# Patient Record
Sex: FEMALE | Race: WHITE | NOT HISPANIC OR LATINO | Employment: FULL TIME | URBAN - METROPOLITAN AREA
[De-identification: names, ages, dates, MRNs, and addresses within clinical notes are randomized per-mention and may not be internally consistent; named-entity substitution may affect disease eponyms.]

---

## 2017-01-27 ENCOUNTER — OFFICE VISIT (OUTPATIENT)
Dept: URGENT CARE | Facility: CLINIC | Age: 42
End: 2017-01-27
Payer: COMMERCIAL

## 2017-01-27 ENCOUNTER — HOSPITAL ENCOUNTER (OUTPATIENT)
Dept: RADIOLOGY | Facility: CLINIC | Age: 42
Discharge: HOME | End: 2017-01-27
Admitting: FAMILY MEDICINE
Payer: COMMERCIAL

## 2017-01-27 DIAGNOSIS — M79.642 PAIN OF LEFT HAND: ICD-10-CM

## 2017-01-27 PROCEDURE — 73130 X-RAY EXAM OF HAND: CPT

## 2017-01-27 PROCEDURE — 99213 OFFICE O/P EST LOW 20 MIN: CPT

## 2017-12-11 ENCOUNTER — APPOINTMENT (OUTPATIENT)
Dept: RADIOLOGY | Facility: CLINIC | Age: 42
End: 2017-12-11
Payer: COMMERCIAL

## 2017-12-11 ENCOUNTER — OFFICE VISIT (OUTPATIENT)
Dept: URGENT CARE | Facility: CLINIC | Age: 42
End: 2017-12-11
Payer: COMMERCIAL

## 2017-12-11 DIAGNOSIS — M25.552 PAIN IN LEFT HIP: ICD-10-CM

## 2017-12-11 PROCEDURE — 99213 OFFICE O/P EST LOW 20 MIN: CPT

## 2017-12-11 PROCEDURE — 73502 X-RAY EXAM HIP UNI 2-3 VIEWS: CPT

## 2017-12-12 NOTE — PROGRESS NOTES
Assessment    1  Lumbosacral strain, initial encounter (846 0) (S39 012A)   2  Strain of left iliopsoas muscle, initial encounter (843 8) (D91 269R)    Plan  Left hip pain    · * XR HIP/PELV 2-3 VWS LEFT W PELVIS IF PERFORMED; Status:Active - RetrospectiveBy Protocol Authorization; Requested for:[id number];   Strain of left iliopsoas muscle, initial encounter    · Hydrocodone-Acetaminophen 5-325 MG Oral Tablet; TAKE 1 TABLET EVERY 4 TO6 HOURS AS NEEDED FOR PAIN   · Methocarbamol 750 MG Oral Tablet (Robaxin-750); TAKE 1 TABLET 4 TIMES DAILYAS NEEDED    Discussion/Summary  Discussion Summary:   Rest; Continue Naproxen; Start Robaxin, take Norco for severe pain, limit activity; recheck 3-4 days if symptoms persist       Chief Complaint    1  Hip Pain  Chief Complaint Free Text Note Form: Yesterday the patient developed left hip pain when getting out of her car  She reports a constant ache in her hip, 6/10  She attempted icy hot and naproxen without any relief  History of Present Illness  HPI: Pt c/o pain, L hip and iliac crest area since getting out of car yesterday;pain increases with motion; no localized tenderness; pain feels like it is on inside of L iliac crest   Hospital Based Practices Required Assessment:  Pain Assessment  the patient states they have pain  The pain is located in the left hip  The patient describes the pain as aching  (on a scale of 0 to 10, the patient rates the pain at 6 ) Reason DV Screen not done: family present   Depression And Suicide Screen  Reason suicide screen not done: family present  Review of Systems  Focused-Female:  Constitutional: No fever, no chills, feels well, no tiredness, no recent weight gain or loss  ENT: no ear ache, no loss of hearing, no nosebleeds or nasal discharge, no sore throat or hoarseness  Cardiovascular: no complaints of slow or fast heart rate, no chest pain, no palpitations, no leg claudication or lower extremity edema    Respiratory: no complaints of shortness of breath, no wheezing, no dyspnea on exertion, no orthopnea or PND  Breasts: no complaints of breast pain, breast lump or nipple discharge  Gastrointestinal: no complaints of abdominal pain, no constipation, no nausea or diarrhea, no vomiting, no bloody stools  Genitourinary: no complaints of dysuria, no incontinence, no pelvic pain, no dysmenorrhea, no vaginal discharge or abnormal vaginal bleeding  Musculoskeletal: L hip area pain, but-- as noted in HPI  Integumentary: no complaints of skin rash or lesion, no itching or dry skin, no skin wounds  Neurological: no complaints of headache, no confusion, no numbness or tingling, no dizziness or fainting  Active Problems    1  Sprain of finger of left hand, initial encounter (842 10) (G20 182C)    Current Meds   1  Omeprazole 40 MG Oral Capsule Delayed Release; Therapy: (Recorded:46Ore0318) to Recorded   2  ZyrTEC Allergy CAPS; Therapy: (Recorded:09Mbl8678) to Recorded    Allergies    1  No Known Drug Allergies    Vitals  Signs   Recorded: 24Zda0355 10:10AM   Temperature: 98 9 F  Heart Rate: 90  Respiration: 16  Systolic: 202  Diastolic: 78  Height: 5 ft 7 in  Weight: 216 lb   BMI Calculated: 33 83  BSA Calculated: 2 09  O2 Saturation: 97  Pain Scale: 6    Physical Exam   Constitutional  General appearance: No acute distress, well appearing and well nourished  Eyes  Conjunctiva and lids: No swelling, erythema or discharge  Pupils and irises: Equal, round and reactive to light  Ears, Nose, Mouth, and Throat  External inspection of ears and nose: Normal    Otoscopic examination: Tympanic membranes translucent with normal light reflex  Canals patent without erythema  Nasal mucosa, septum, and turbinates: Normal without edema or erythema  Oropharynx: Normal with no erythema, edema, exudate or lesions  Pulmonary  Respiratory effort: No increased work of breathing or signs of respiratory distress     Auscultation of lungs: Clear to auscultation  Cardiovascular  Palpation of heart: Normal PMI, no thrills  Auscultation of heart: Normal rate and rhythm, normal S1 and S2, without murmurs  Examination of extremities for edema and/or varicosities: Normal    Abdomen  Abdomen: Non-tender, no masses  Liver and spleen: No hepatomegaly or splenomegaly  Lymphatic  Palpation of lymph nodes in neck: No lymphadenopathy  Musculoskeletal  Gait and station: Abnormal  -- Pain, iliac crest with any movemen t of L leg, back  Digits and nails: Normal without clubbing or cyanosis  Inspection/palpation of joints, bones, and muscles: Abnormal  -- Full ROM of hip, L elg  Skin  Skin and subcutaneous tissue: Normal without rashes or lesions  Neurologic  Cranial nerves: Cranial nerves 2-12 intact  Reflexes: 2+ and symmetric  Sensation: No sensory loss  Psychiatric  Orientation to person, place, and time: Normal    Mood and affect: Normal        Results/Data  Diagnostic Studies Reviewed: I personally reviewed the films/images/results in the office today  My interpretation follows  X-ray Review L hip - Negative - TF        Signatures   Electronically signed by : Ilsa Saint, M D ; Dec 11 2017 10:54AM EST                       (Author)

## 2018-01-05 ENCOUNTER — OFFICE VISIT (OUTPATIENT)
Dept: URGENT CARE | Facility: CLINIC | Age: 43
End: 2018-01-05
Payer: COMMERCIAL

## 2018-01-05 PROCEDURE — 99213 OFFICE O/P EST LOW 20 MIN: CPT

## 2018-01-06 NOTE — PROGRESS NOTES
Assessment   1  History of Strain of left iliopsoas muscle, initial encounter (843 8) (F26 844C)   2  Strain of left iliopsoas muscle, initial encounter (843 8) (L91 326X)    Plan   Strain of left iliopsoas muscle, initial encounter    · Hydrocodone-Acetaminophen 5-325 MG Oral Tablet (Norco); TAKE 1 TABLET    EVERY 4 TO 6 HOURS AS NEEDED FOR PAIN   · Methocarbamol 750 MG Oral Tablet (Robaxin-750); TAKE 1 TABLET 4 TIMES DAILY    AS NEEDED    Discussion/Summary   Discussion Summary:    Norco for sever pain, Robaxin 4 x a day, Recheck with PCP if symptoms  Chief Complaint   1  Hip Pain  Chief Complaint Free Text Note Form: Patient relates injured left hip x1 month ago and yesterday bend down to  a box and re-injured left hip  Denies falling  Denies fever  History of Present Illness   HPI: Pt seen here last month for L hip/iliopsoas strain; got better but symptoms recurred last PM when bending over    Hospital Based Practices Required Assessment:      Abuse And Domestic Violence Screen       Yes, the patient is safe at home  -- The patient states no one is hurting them  Depression And Suicide Screen  No, the patient has not had thoughts of hurting themself  No, the patient has not felt depressed in the past 7 days  Prefered Language is  english  Primary Language is  english  Review of Systems   Focused-Female:      Constitutional: No fever, no chills, feels well, no tiredness, no recent weight gain or loss  ENT: no ear ache, no loss of hearing, no nosebleeds or nasal discharge, no sore throat or hoarseness  Cardiovascular: no complaints of slow or fast heart rate, no chest pain, no palpitations, no leg claudication or lower extremity edema  Respiratory: no complaints of shortness of breath, no wheezing, no dyspnea on exertion, no orthopnea or PND  Breasts: no complaints of breast pain, breast lump or nipple discharge        Gastrointestinal: no complaints of abdominal pain, no constipation, no nausea or diarrhea, no vomiting, no bloody stools  Genitourinary: no complaints of dysuria, no incontinence, no pelvic pain, no dysmenorrhea, no vaginal discharge or abnormal vaginal bleeding  Musculoskeletal: arthralgias-- and-- L hip pain with motion, but-- as noted in HPI  Integumentary: no complaints of skin rash or lesion, no itching or dry skin, no skin wounds  Neurological: no complaints of headache, no confusion, no numbness or tingling, no dizziness or fainting  Active Problems   1  Left hip pain (719 45) (M25 552)   2  Lumbosacral strain, initial encounter (846 0) (S39 012A)   3  Sprain of finger of left hand, initial encounter (842 10) (T00 111Y)    Past Medical History   1  History of Left hip pain (719 45) (M25 552)   2  History of Strain of left iliopsoas muscle, initial encounter (843 8) (Z15 751Q)    Family History   Mother    1  Family history of malignant neoplasm of breast (V16 3) (Z80 3)    Social History    · Never a smoker    Surgical History   1  Denied: History Of Prior Surgery    Current Meds    1  Hydrocodone-Acetaminophen 5-325 MG Oral Tablet; TAKE 1 TABLET EVERY 4 TO 6     HOURS AS NEEDED FOR PAIN;     Therapy: [id number] to (Evaluate:93Ans9230); Last Rx:11Dec2017 Ordered   2  Methocarbamol 750 MG Oral Tablet; TAKE 1 TABLET 4 TIMES DAILY AS NEEDED; Therapy: 02SYJ0442 to (Evaluate:27Yka4018)  Requested for: 11Dec2017; Last     Rx:11Dec2017 Ordered   3  Omeprazole 40 MG Oral Capsule Delayed Release; Therapy: (Recorded:11Dec2017) to Recorded   4  ZyrTEC Allergy CAPS; Therapy: (Recorded:27Jan2017) to Recorded    Allergies   1   No Known Drug Allergies    Vitals   Signs   Recorded: 81XSH8839 11:27AM   Temperature: 100 1 F, Tympanic  Heart Rate: 79  Respiration: 18  Systolic: 515, RUE, Sitting  Diastolic: 86, RUE, Sitting  Height: 5 ft 7 in  Weight: 216 lb 6 oz  BMI Calculated: 33 89  BSA Calculated: 2 09  O2 Saturation: 99, RA  Pain Scale: 6    Physical Exam        Constitutional      General appearance: No acute distress, well appearing and well nourished  Eyes      Conjunctiva and lids: No swelling, erythema or discharge  Pupils and irises: Equal, round and reactive to light  Ears, Nose, Mouth, and Throat      External inspection of ears and nose: Normal        Otoscopic examination: Tympanic membranes translucent with normal light reflex  Canals patent without erythema  Nasal mucosa, septum, and turbinates: Normal without edema or erythema  Oropharynx: Normal with no erythema, edema, exudate or lesions  Pulmonary      Respiratory effort: No increased work of breathing or signs of respiratory distress  Auscultation of lungs: Clear to auscultation  Cardiovascular      Palpation of heart: Normal PMI, no thrills  Auscultation of heart: Normal rate and rhythm, normal S1 and S2, without murmurs  Examination of extremities for edema and/or varicosities: Normal        Abdomen      Abdomen: Non-tender, no masses  Liver and spleen: No hepatomegaly or splenomegaly  Lymphatic      Palpation of lymph nodes in neck: No lymphadenopathy  Musculoskeletal      Gait and station: Normal        Digits and nails: Normal without clubbing or cyanosis  Inspection/palpation of joints, bones, and muscles: Abnormal  -- tenderness at L iliac crest with movement, bending  Skin      Skin and subcutaneous tissue: Normal without rashes or lesions  Neurologic      Cranial nerves: Cranial nerves 2-12 intact  Reflexes: 2+ and symmetric  Sensation: No sensory loss         Psychiatric      Orientation to person, place, and time: Normal        Mood and affect: Normal        Signatures    Electronically signed by : EVERETT Shaffer ; Jan 5 2018 11:48AM EST                       (Author)

## 2018-01-23 VITALS
DIASTOLIC BLOOD PRESSURE: 86 MMHG | HEIGHT: 67 IN | HEART RATE: 79 BPM | OXYGEN SATURATION: 99 % | WEIGHT: 216.38 LBS | TEMPERATURE: 100.1 F | BODY MASS INDEX: 33.96 KG/M2 | RESPIRATION RATE: 18 BRPM | SYSTOLIC BLOOD PRESSURE: 159 MMHG

## 2018-01-23 VITALS
WEIGHT: 216 LBS | HEIGHT: 67 IN | TEMPERATURE: 98.9 F | OXYGEN SATURATION: 97 % | SYSTOLIC BLOOD PRESSURE: 132 MMHG | RESPIRATION RATE: 16 BRPM | DIASTOLIC BLOOD PRESSURE: 78 MMHG | HEART RATE: 90 BPM | BODY MASS INDEX: 33.9 KG/M2

## 2018-08-07 ENCOUNTER — TRANSCRIBE ORDERS (OUTPATIENT)
Dept: SCHEDULING | Age: 43
End: 2018-08-07

## 2018-08-07 DIAGNOSIS — Z12.31 ENCOUNTER FOR SCREENING MAMMOGRAM FOR MALIGNANT NEOPLASM OF BREAST: Primary | ICD-10-CM

## 2018-08-09 ENCOUNTER — HOSPITAL ENCOUNTER (OUTPATIENT)
Dept: RADIOLOGY | Age: 43
Discharge: HOME | End: 2018-08-09
Attending: NURSE PRACTITIONER
Payer: COMMERCIAL

## 2018-08-09 DIAGNOSIS — Z12.31 ENCOUNTER FOR SCREENING MAMMOGRAM FOR MALIGNANT NEOPLASM OF BREAST: ICD-10-CM

## 2018-08-09 PROCEDURE — 77067 SCR MAMMO BI INCL CAD: CPT

## 2018-11-02 ENCOUNTER — APPOINTMENT (OUTPATIENT)
Dept: RADIOLOGY | Facility: CLINIC | Age: 43
End: 2018-11-02
Payer: COMMERCIAL

## 2018-11-02 ENCOUNTER — OFFICE VISIT (OUTPATIENT)
Dept: URGENT CARE | Facility: CLINIC | Age: 43
End: 2018-11-02
Payer: COMMERCIAL

## 2018-11-02 VITALS
RESPIRATION RATE: 16 BRPM | DIASTOLIC BLOOD PRESSURE: 78 MMHG | TEMPERATURE: 99.1 F | SYSTOLIC BLOOD PRESSURE: 134 MMHG | OXYGEN SATURATION: 99 % | BODY MASS INDEX: 35 KG/M2 | HEART RATE: 68 BPM | HEIGHT: 67 IN | WEIGHT: 223 LBS

## 2018-11-02 DIAGNOSIS — S93.402A SPRAIN OF LEFT ANKLE, UNSPECIFIED LIGAMENT, INITIAL ENCOUNTER: Primary | ICD-10-CM

## 2018-11-02 DIAGNOSIS — M25.572 ACUTE LEFT ANKLE PAIN: ICD-10-CM

## 2018-11-02 PROCEDURE — 99213 OFFICE O/P EST LOW 20 MIN: CPT | Performed by: PHYSICIAN ASSISTANT

## 2018-11-02 PROCEDURE — 73610 X-RAY EXAM OF ANKLE: CPT

## 2018-11-02 RX ORDER — OMEPRAZOLE 40 MG/1
CAPSULE, DELAYED RELEASE ORAL
COMMUNITY

## 2018-11-02 RX ORDER — NORETHINDRONE ACETATE AND ETHINYL ESTRADIOL AND FERROUS FUMARATE 1MG-20(21)
1 KIT ORAL DAILY
Refills: 3 | COMMUNITY
Start: 2018-09-11

## 2018-11-02 NOTE — PROGRESS NOTES
[facility]        NAME: Abena Charles is a 36 y o  female  : 1975    MRN: [id number]  DATE: 2018  TIME: 4:39 PM    Assessment and Plan   Acute left ankle pain [M25 572]  1  Acute left ankle pain  XR ankle 3+ vw left         Patient Instructions       Rest, ice, elevate the affected limb  Take over-the-counter pain medication for symptom relief  Follow up with Orthopedics this week  Call Page Anthony to schedule an appointment: [phone]  Go to ER if new or worsening symptoms occur  Chief Complaint     Chief Complaint   Patient presents with    Ankle Pain     Two days ago the pt developed pain in her left ankle that is made worse when walking/standing  She denies any  injury  History of Present Illness       Ankle Pain    Injury mechanism: Patient twisted her ankle twice over the summer, seemed to heal on its own  Patient went out trigger treating and walk a few miles and now the pain in her ankle is back  The pain is present in the left ankle  The pain is at a severity of 8/10  The pain is severe  The pain has been constant since onset  Pertinent negatives include no inability to bear weight, loss of motion, loss of sensation, muscle weakness, numbness or tingling  The symptoms are aggravated by palpation, movement and weight bearing  She has tried ice and NSAIDs for the symptoms  The treatment provided mild relief  Review of Systems   Review of Systems   Constitutional: Negative  Negative for chills, fatigue and fever  HENT: Negative  Eyes: Negative  Respiratory: Negative  Cardiovascular: Negative  Gastrointestinal: Negative for abdominal pain, constipation, diarrhea, nausea and vomiting  Endocrine: Negative  Genitourinary: Negative for dysuria, flank pain, frequency, pelvic pain, vaginal discharge and vaginal pain  Musculoskeletal: Positive for gait problem  Negative for back pain, neck pain and neck stiffness     Skin: Negative  Negative for pallor and rash  Allergic/Immunologic: Negative  Neurological: Negative for tingling, weakness and numbness  Hematological: Negative  Psychiatric/Behavioral: Negative  Current Medications       Current Outpatient Prescriptions:     Cetirizine HCl (ZYRTEC ALLERGY) 10 MG CAPS, Take by mouth, Disp: , Rfl:     JUNEL FE 1/20 1-20 MG-MCG per tablet, Take 1 tablet by mouth daily, Disp: , Rfl: 3    omeprazole (PriLOSEC) 40 MG capsule, Take by mouth, Disp: , Rfl:     Current Allergies     Allergies as of 11/02/2018    (No Known Allergies)            The following portions of the patient's history were reviewed and updated as appropriate: allergies, current medications, past family history, past medical history, past social history, past surgical history and problem list      No past medical history on file  No past surgical history on file  No family history on file  Medications have been verified  Objective   /78   Pulse 68   Temp 99 1 °F (37 3 °C)   Resp 16   Ht 5' 7" (1 702 m)   Wt 101 kg (223 lb)   SpO2 99%   BMI 34 93 kg/m²        Physical Exam     Physical Exam   Constitutional: She appears well-developed and well-nourished  No distress  HENT:   Head: Normocephalic and atraumatic  Neck: Normal range of motion  Neck supple  Cardiovascular: Normal rate, regular rhythm, normal heart sounds and intact distal pulses  Pulmonary/Chest: Effort normal and breath sounds normal  No respiratory distress  She has no wheezes  She has no rales  Musculoskeletal:        Left ankle: She exhibits normal range of motion, no swelling, no ecchymosis and no deformity  Tenderness  AITFL tenderness found  Achilles tendon normal         Left lower leg: Normal  She exhibits no tenderness, no swelling and no edema  Left foot: There is normal range of motion, no bony tenderness, no swelling and no deformity     Lymphadenopathy:     She has no cervical adenopathy  Skin: Skin is warm  No rash noted  She is not diaphoretic  Nursing note and vitals reviewed

## 2018-11-02 NOTE — PATIENT INSTRUCTIONS
Rest, ice, elevate the affected limb  Take over-the-counter pain medication for symptom relief  Follow up with Orthopedics this week  Call Sabiha Ureña to schedule an appointment: [phone]  Go to ER if new or worsening symptoms occur  Ankle Sprain   WHAT YOU NEED TO KNOW:   An ankle sprain happens when 1 or more ligaments in your ankle joint stretch or tear  Ligaments are tough tissues that connect bones  Ligaments support your joints and keep your bones in place  DISCHARGE INSTRUCTIONS:   Return to the emergency department if:   · You have severe pain in your ankle  · Your foot or toes are cold or numb  · Your ankle becomes more weak or unstable (wobbly)  · You are unable to put any weight on your ankle or foot  · Your swelling has increased or returned  Contact your healthcare provider if:   · Your pain does not go away, even after treatment  · You have questions or concerns about your condition or care  Medicines: You may need any of the following:  · NSAIDs , such as ibuprofen, help decrease swelling, pain, and fever  This medicine is available with or without a doctor's order  NSAIDs can cause stomach bleeding or kidney problems in certain people  If you take blood thinner medicine, always ask your healthcare provider if NSAIDs are safe for you  Always read the medicine label and follow directions  · Acetaminophen  decreases pain  It is available without a doctor's order  Ask how much to take and how often to take it  Follow directions  Acetaminophen can cause liver damage if not taken correctly  · Prescription pain medicine  may be given  Ask how to take this medicine safely  · Take your medicine as directed  Contact your healthcare provider if you think your medicine is not helping or if you have side effects  Tell him or her if you are allergic to any medicine  Keep a list of the medicines, vitamins, and herbs you take   Include the amounts, and when and why you take them  Bring the list or the pill bottles to follow-up visits  Carry your medicine list with you in case of an emergency  Self care:   · Use support devices,  such as a brace, cast, or splint, may be needed to limit your movement and protect your joint  You may need to use crutches to decrease your pain as you move around  · Go to physical therapy as directed  A physical therapist teaches you exercises to help improve movement and strength, and to decrease pain  · Rest  your ankle so that it can heal  Return to normal activities as directed  · Apply ice on your ankle for 15 to 20 minutes every hour or as directed  Use an ice pack, or put crushed ice in a plastic bag  Cover it with a towel  Ice helps prevent tissue damage and decreases swelling and pain  · Compress  your ankle  Ask if you should wrap an elastic bandage around your injured ligament  An elastic bandage provides support and helps decrease swelling and movement so your joint can heal  Wear as long as directed  · Elevate  your ankle above the level of your heart as often as you can  This will help decrease swelling and pain  Prop your ankle on pillows or blankets to keep it elevated comfortably  Prevent another ankle sprain:   · Let your ankle heal   Find out how long your ligament needs to heal  Do not do any physical activity until your healthcare provider says it is okay  If you start activity too soon, you may develop a more serious injury  · Always warm up and stretch  before you exercise or play sports  · Use the right equipment  Always wear shoes that fit well and are made for the activity that you are doing  You may also need ankle supports, elbow and knee pads, or braces  Follow up with your healthcare provider as directed:  Write down your questions so you remember to ask them during your visits     © 2017 [employer] Information is for End User's use only and may not be sold, redistributed or otherwise used for commercial purposes  All illustrations and images included in CareNotes® are the copyrighted property of A D A M , Inc  or Nicolas Silva  The above information is an  only  It is not intended as medical advice for individual conditions or treatments  Talk to your doctor, nurse or pharmacist before following any medical regimen to see if it is safe and effective for you

## 2018-12-28 ENCOUNTER — OFFICE VISIT (OUTPATIENT)
Dept: URGENT CARE | Facility: CLINIC | Age: 43
End: 2018-12-28
Payer: COMMERCIAL

## 2018-12-28 ENCOUNTER — APPOINTMENT (OUTPATIENT)
Dept: RADIOLOGY | Facility: CLINIC | Age: 43
End: 2018-12-28
Payer: COMMERCIAL

## 2018-12-28 VITALS
BODY MASS INDEX: 36.16 KG/M2 | RESPIRATION RATE: 16 BRPM | TEMPERATURE: 97 F | DIASTOLIC BLOOD PRESSURE: 76 MMHG | HEART RATE: 98 BPM | OXYGEN SATURATION: 97 % | HEIGHT: 66 IN | SYSTOLIC BLOOD PRESSURE: 132 MMHG | WEIGHT: 225 LBS

## 2018-12-28 DIAGNOSIS — S69.92XA HAND INJURY, LEFT, INITIAL ENCOUNTER: Primary | ICD-10-CM

## 2018-12-28 DIAGNOSIS — S69.92XA HAND INJURY, LEFT, INITIAL ENCOUNTER: ICD-10-CM

## 2018-12-28 PROCEDURE — 73130 X-RAY EXAM OF HAND: CPT

## 2018-12-28 PROCEDURE — 99214 OFFICE O/P EST MOD 30 MIN: CPT | Performed by: NURSE PRACTITIONER

## 2018-12-28 NOTE — PATIENT INSTRUCTIONS
Hand Sprain   WHAT YOU NEED TO KNOW:   A hand sprain is when a ligament in your hand is stretched or torn  Ligaments are the strong tissues that connect bones  A hand sprain is usually caused by a fall onto your outstretched arm  You may have bruising, pain, and swelling of your injured hand  DISCHARGE INSTRUCTIONS:   Rest your hand: You will need to rest your hand for 1 to 2 days after your injury  This will help decrease the risk of more damage to your hand  Do not lift anything with your injured hand  Ask your healthcare provider when you can return to your normal activities  Ice your hand:  Ice your hand to help decrease swelling and pain  Put crushed ice in a plastic bag and cover it with a towel  Put the ice on your hand for 15 to 20 minutes every hour  Use ice as directed  Use compression:  Compression (tight hold) provides support and helps decrease swelling and movement so your hand can heal  You may need to keep your hand wrapped with an elastic bandage  Elevate your hand:  Keep your injured hand raised above the level of your heart as often as you can  This will help decrease or limit swelling  You can elevate your hand by resting your arm up on a pillow  Use a splint:  You may need to use a splint on your hand and wrist  A splint is a special device that keeps your hand and wrist from moving  Use the splint as directed  Medicines:   · NSAIDs  help decrease swelling and pain or fever  This medicine is available with or without a doctor's order  NSAIDs can cause stomach bleeding or kidney problems in certain people  If you take blood thinner medicine, always ask your healthcare provider if NSAIDs are safe for you  Always read the medicine label and follow directions  · Take your medicine as directed:  Call your healthcare provider if you think your medicine is not helping or if you have side effects  Tell him if you are taking any vitamins, herbs, or other medicines   Keep a list of the medicines you take  Include the amounts, and when and why you take them  Bring the list or the pill bottles to follow up visits  Exercise your hand: You may be given exercises to improve your strength once you are able to move your hand without pain  Exercises will also help decrease stiffness  Start your exercises and normal activities slowly  Exercise your hand as directed  Follow up with your healthcare provider as directed:  Write down your questions you so you remember to ask them during your visits  Call your healthcare provider if:   · The skin of your injured hand looks bluish or pale (less color than normal)  · You have increased swelling and pain in your hand  · You have new or increased numbness in your injured hand  · You have new or increased stiffness or trouble moving your injured hand  · You have questions or concerns about your injury or treatment  © 2017 [employer] [employer] Information is for End User's use only and may not be sold, redistributed or otherwise used for commercial purposes  All illustrations and images included in CareNotes® are the copyrighted property of A D A M [employer] Inc  or Nicolas Silva  The above information is an  only  It is not intended as medical advice for individual conditions or treatments  Talk to your doctor, nurse or pharmacist before following any medical regimen to see if it is safe and effective for you

## 2018-12-28 NOTE — PROGRESS NOTES
Assessment/Plan    Hand injury, left, initial encounter [S69 92XA]  1  Hand injury, left, initial encounter  XR hand 3+ vw left     - x-rays negative for fracture  - advised to take Motrin or Tylenol OTC for pain  - to follow up with PCP  - hand brace given to patient at the clinic        Subjective:  Presents to clinic with complaint of left hand pain     Patient ID: Trina Sanchez is a 39 y o  female  Reason For Visit / Chief Complaint  Chief Complaint   Patient presents with    Fall     the pt fell yesterday and reports pain and swelling in her left hand at the base of her 4th and 5th fingers         HPI  States she was going down the steps and fell in the garage  She was 3 steps up  Fell forward  She cannot quite explain how she hurt the left hand  But she is sure that she did not heed the hand on the floor  She thinks she may have heard it on the door knob or on an object on the wall in the garage  At rest pain is 3/10  And with use of the left hand 8/10  No radiation of the pain  No previous fracture  She took Motrin yesterday when it happened, and at 6:30 a m  this morning (about 4 hours ago)  No past medical history on file  No past surgical history on file  No family history on file  Review of Systems   Respiratory: Negative for shortness of breath and wheezing  Cardiovascular: Negative for chest pain  Musculoskeletal: Positive for joint swelling ( left pinky finger) and myalgias (Left hand)  Negative for back pain, gait problem, neck pain and neck stiffness  Objective:    /76   Pulse 98   Temp (!) 97 °F (36 1 °C)   Resp 16   Ht 5' 6" (1 676 m)   Wt 102 kg (225 lb)   SpO2 97%   BMI 36 32 kg/m²     Physical Exam   Musculoskeletal:   There is mild swelling on the left 5th finger, with decreased range of motion  Much less decreased range of motion on the 4th finger and with minimal swelling  Rest of fingers of the left hand within normal limits  Decreased  strength secondary to pain on the 5th finger  Moderate swelling on the left 5th metacarpal, weak moderate tenderness on palpation  No redness    Will order x-ray to rule out fracture

## 2019-01-10 ENCOUNTER — OFFICE VISIT (OUTPATIENT)
Dept: OBGYN CLINIC | Facility: CLINIC | Age: 44
End: 2019-01-10
Payer: COMMERCIAL

## 2019-01-10 VITALS
BODY MASS INDEX: 36.16 KG/M2 | HEIGHT: 66 IN | SYSTOLIC BLOOD PRESSURE: 122 MMHG | HEART RATE: 80 BPM | DIASTOLIC BLOOD PRESSURE: 84 MMHG | WEIGHT: 225 LBS

## 2019-01-10 DIAGNOSIS — S63.657A SPRAIN OF METACARPOPHALANGEAL (MCP) JOINT OF LEFT LITTLE FINGER, INITIAL ENCOUNTER: Primary | ICD-10-CM

## 2019-01-10 PROCEDURE — 99203 OFFICE O/P NEW LOW 30 MIN: CPT | Performed by: ORTHOPAEDIC SURGERY

## 2019-01-10 NOTE — PROGRESS NOTES
Assessment:     1  Sprain of metacarpophalangeal (MCP) joint of left little finger, initial encounter        Plan:  The patient was seen and examined by Dr Holly Waldron and myself  Problem List Items Addressed This Visit        Musculoskeletal and Integument    Sprain of metacarpophalangeal (MCP) joint of left little finger - Primary     Findings consistent with sprain of the left radial collateral ligament of the MCP joint of the small finger  Findings and treatment options were discussed with the patient  Recommend haider taping ring and small fingers  Encouraged gentle range of motion  Limit use of that hand  Continue ice and NSAIDs  Follow up in 6 weeks for re-evaluation  All questions were answered to patient's satisfaction  Subjective:     Patient ID: Temo Kathleen is a 39 y o  female  Chief Complaint: This is a right-hand-dominant 54-year-old white female who suffered injury to her left hand on December 27, 2018  Patient tripped over a stair leading into her home and her left small finger hit into the door jam   She is unsure which way her finger was bed when hitting the doorjamb  She had immediate pain and swelling over the MCP joint  She has been icing and taking NSAIDs  The swelling has improved, but she has limited motion and pain at this point  She was seen at urgent care and x-rays were taken that were negative  She was placed in a volar wrist splint  She denies any previous injury to that hand  Patient intake form was reviewed today         Allergy:  No Known Allergies  Medications:  all current active meds have been reviewed  Past Medical History:  Past Medical History:   Diagnosis Date    Acid reflux      Past Surgical History:  Past Surgical History:   Procedure Laterality Date    AUGMENTATION BREAST Bilateral 2009    lt augmented and rt reduction     Family History:  Family History   Problem Relation Age of Onset   [employer] Breast cancer Mother     Hypertension Father    [employer] Breast cancer Maternal Grandmother     Uterine cancer Maternal Grandmother     Breast cancer Paternal Grandmother     Hypertension Paternal Grandfather      Social History:  History   Alcohol Use    Yes     Comment: social     History   Drug Use No     History   Smoking Status    Never Smoker   Smokeless Tobacco    Never Used     Review of Systems   Constitutional: Negative  HENT: Negative  Eyes: Negative  Respiratory: Negative  Cardiovascular: Negative  Gastrointestinal: Negative  Endocrine: Negative  Genitourinary: Negative  Musculoskeletal: Positive for arthralgias and joint swelling  Skin: Negative  Allergic/Immunologic: Negative  Neurological: Negative  Hematological: Negative  Psychiatric/Behavioral: Negative  Objective:  BP Readings from Last 1 Encounters:   01/10/19 122/84      Wt Readings from Last 1 Encounters:   01/10/19 102 kg (225 lb)      BMI:   Estimated body mass index is 36 32 kg/m² as calculated from the following:    Height as of this encounter: 5' 6" (1 676 m)  Weight as of this encounter: 102 kg (225 lb)  BSA:   Estimated body surface area is 2 1 meters squared as calculated from the following:    Height as of this encounter: 5' 6" (1 676 m)  Weight as of this encounter: 102 kg (225 lb)  Physical Exam   Constitutional: She is oriented to person, place, and time  She appears well-developed  HENT:   Head: Normocephalic and atraumatic  Eyes: Conjunctivae and EOM are normal    Neck: Neck supple  Neurological: She is alert and oriented to person, place, and time  Skin: Skin is warm  Psychiatric: She has a normal mood and affect  Nursing note and vitals reviewed  Right Hand Exam   Right hand exam is normal       Left Hand Exam     Tenderness   Left hand tenderness location: Radial collateral ligament MCP joint of small finger       Range of Motion     Hand   MP Little: 30     Other   Erythema: absent  Scars: absent  Sensation: normal  Pulse: present    Comments:  Swelling over MCP joint of small finger  Pain and slight instability of radial collateral ligament with stress  Flexor and extensor tendons intact  No visual deformity            I have personally reviewed pertinent films in PACS  X-rays left hand reveal no acute fractures

## 2019-01-10 NOTE — ASSESSMENT & PLAN NOTE
Findings consistent with sprain of the left radial collateral ligament of the MCP joint of the small finger  Findings and treatment options were discussed with the patient  Recommend haider taping ring and small fingers  Encouraged gentle range of motion  Limit use of that hand  Continue ice and NSAIDs  Follow up in 6 weeks for re-evaluation  All questions were answered to patient's satisfaction  Name band;

## 2019-02-15 ENCOUNTER — OFFICE VISIT (OUTPATIENT)
Dept: OBGYN CLINIC | Facility: CLINIC | Age: 44
End: 2019-02-15
Payer: COMMERCIAL

## 2019-02-15 VITALS
WEIGHT: 228 LBS | BODY MASS INDEX: 36.64 KG/M2 | HEIGHT: 66 IN | SYSTOLIC BLOOD PRESSURE: 140 MMHG | HEART RATE: 80 BPM | DIASTOLIC BLOOD PRESSURE: 82 MMHG

## 2019-02-15 DIAGNOSIS — S63.657D SPRAIN OF METACARPOPHALANGEAL (MCP) JOINT OF LEFT LITTLE FINGER, SUBSEQUENT ENCOUNTER: Primary | ICD-10-CM

## 2019-02-15 PROCEDURE — 99213 OFFICE O/P EST LOW 20 MIN: CPT | Performed by: ORTHOPAEDIC SURGERY

## 2019-02-15 NOTE — ASSESSMENT & PLAN NOTE
Findings consistent with right little finger MCP sprain  Discussed findings and treatment options with the patient  I recommend patient to continue haider taping the fingers  I will refer patient to occupational therapy for rehabilitation  I will see patient back in 4-6 weeks for re-evaluation  All patient's questions were answered to his satisfaction  This note is created using dictation transcription  It may contain typographical errors, grammatical errors, improperly dictated words, background noise and other errors

## 2019-02-15 NOTE — PROGRESS NOTES
Assessment:     1  Sprain of metacarpophalangeal (MCP) joint of left little finger, subsequent encounter        Plan:     Problem List Items Addressed This Visit        Musculoskeletal and Integument    Sprain of metacarpophalangeal (MCP) joint of left little finger - Primary     Findings consistent with right little finger MCP sprain  Discussed findings and treatment options with the patient  I recommend patient to continue haider taping the fingers  I will refer patient to occupational therapy for rehabilitation  I will see patient back in 4-6 weeks for re-evaluation  All patient's questions were answered to his satisfaction  This note is created using dictation transcription  It may contain typographical errors, grammatical errors, improperly dictated words, background noise and other errors  Relevant Orders    Ambulatory referral to Occupational Therapy         Subjective:     Patient ID: Vinicio Villafana is a 39 y o  female  Chief Complaint:  78-year-old white female follow-up right little finger injury since December 2018  She has been haider taping her ring and little finger  She continued to have pain over her right little finger in range of motion  She feels the finger is not as strong  She continued to have swelling over the palm side of the little finger  She is able to actively moving her right little finger without limitation, but she feels the little finger is weak when moving it independently      Allergy:  No Known Allergies  Medications:  all current active meds have been reviewed  Past Medical History:  Past Medical History:   Diagnosis Date    Acid reflux      Past Surgical History:  Past Surgical History:   Procedure Laterality Date    AUGMENTATION BREAST Bilateral 2009    lt augmented and rt reduction     Family History:  Family History   Problem Relation Age of Onset   Inocencio Anderson Breast cancer Mother     Hypertension Father     Breast cancer Maternal Grandmother     Uterine cancer Maternal Grandmother     Breast cancer Paternal Grandmother     Hypertension Paternal Grandfather      Social History:  Social History     Substance and Sexual Activity   Alcohol Use Yes    Comment: social     Social History     Substance and Sexual Activity   Drug Use No     Social History     Tobacco Use   Smoking Status Never Smoker   Smokeless Tobacco Never Used     Review of Systems   Constitutional: Negative  HENT: Negative  Eyes: Negative  Respiratory: Negative  Cardiovascular: Negative  Gastrointestinal: Negative  Endocrine: Negative  Genitourinary: Negative  Musculoskeletal: Positive for arthralgias (Right little finger) and joint swelling (Right little finger)  Skin: Negative  Allergic/Immunologic: Negative  Neurological: Negative  Hematological: Negative  Psychiatric/Behavioral: Negative  Objective:  BP Readings from Last 1 Encounters:   02/15/19 140/82      Wt Readings from Last 1 Encounters:   02/15/19 103 kg (228 lb)      BMI:   Estimated body mass index is 36 8 kg/m² as calculated from the following:    Height as of this encounter: 5' 6" (1 676 m)  Weight as of this encounter: 103 kg (228 lb)  BSA:   Estimated body surface area is 2 11 meters squared as calculated from the following:    Height as of this encounter: 5' 6" (1 676 m)  Weight as of this encounter: 103 kg (228 lb)  Physical Exam   Constitutional: She is oriented to person, place, and time  She appears well-developed  HENT:   Head: Normocephalic and atraumatic  Eyes: Conjunctivae and EOM are normal    Neck: Neck supple  Neurological: She is alert and oriented to person, place, and time  Skin: Skin is warm  Psychiatric: She has a normal mood and affect  Nursing note and vitals reviewed  Left Hand Exam     Tenderness   Left hand tenderness location: Over the radial aspect of little finger MCP joint  Range of Motion   The patient has normal left wrist ROM      Muscle Strength   :  4/5     Other   Erythema: absent  Sensation: normal  Pulse: present    Comments:  Swelling over the volar aspect of right little finger MCP            NA

## 2019-02-26 ENCOUNTER — EVALUATION (OUTPATIENT)
Dept: OCCUPATIONAL THERAPY | Facility: CLINIC | Age: 44
End: 2019-02-26
Payer: COMMERCIAL

## 2019-02-26 DIAGNOSIS — S63.657D SPRAIN OF METACARPOPHALANGEAL (MCP) JOINT OF LEFT LITTLE FINGER, SUBSEQUENT ENCOUNTER: ICD-10-CM

## 2019-02-26 PROCEDURE — 97140 MANUAL THERAPY 1/> REGIONS: CPT | Performed by: OCCUPATIONAL THERAPIST

## 2019-02-26 PROCEDURE — 97165 OT EVAL LOW COMPLEX 30 MIN: CPT | Performed by: OCCUPATIONAL THERAPIST

## 2019-03-04 ENCOUNTER — APPOINTMENT (OUTPATIENT)
Dept: OCCUPATIONAL THERAPY | Facility: CLINIC | Age: 44
End: 2019-03-04
Payer: COMMERCIAL

## 2019-03-07 ENCOUNTER — OFFICE VISIT (OUTPATIENT)
Dept: OCCUPATIONAL THERAPY | Facility: CLINIC | Age: 44
End: 2019-03-07
Payer: COMMERCIAL

## 2019-03-07 DIAGNOSIS — S63.657D SPRAIN OF METACARPOPHALANGEAL (MCP) JOINT OF LEFT LITTLE FINGER, SUBSEQUENT ENCOUNTER: Primary | ICD-10-CM

## 2019-03-07 PROCEDURE — 97022 WHIRLPOOL THERAPY: CPT | Performed by: OCCUPATIONAL THERAPIST

## 2019-03-07 PROCEDURE — 97110 THERAPEUTIC EXERCISES: CPT | Performed by: OCCUPATIONAL THERAPIST

## 2019-03-07 PROCEDURE — 97140 MANUAL THERAPY 1/> REGIONS: CPT | Performed by: OCCUPATIONAL THERAPIST

## 2019-03-07 NOTE — PROGRESS NOTES
Daily Note     Today's date: 3/7/2019  Patient name: Temo Kathleen  : 1975  MRN: [id number]  Referring provider: Faina Eller MD  Dx:   Encounter Diagnosis     ICD-10-CM    1  Sprain of metacarpophalangeal (MCP) joint of left little finger, subsequent encounter S63 657D                   Subjective: I don't think it bends any more      Objective: See treatment diary below    Precautions: universal    Daily Treatment Diary     Manual  2/26 3/7           Graston hand 3m 3m           MOB MCP  3m 4m           Dorsal Capsular stretches 2m 3m           Intrinsic stretches  2m                            Exercise Diary  2/26 3/7           AROM digits  10x 10x           PROM SF  10x           powerweb   Red  3x10             Manipulation  balls  1m                                                                                                                                                                                                              HEP - A/PROM 5x day                Modalities  2/26 3/7           MH with flex 10m             fluido   10m                                    Assessment: Tolerated treatment well  Patient has improved MCP flexion  Right Digits   Flexion   Little     MCP: 72 , previous 62    PIP:  94  Previous  90    DIP: 80    Plan: Continue per plan of care

## 2019-03-11 ENCOUNTER — OFFICE VISIT (OUTPATIENT)
Dept: OCCUPATIONAL THERAPY | Facility: CLINIC | Age: 44
End: 2019-03-11
Payer: COMMERCIAL

## 2019-03-11 DIAGNOSIS — S63.657D SPRAIN OF METACARPOPHALANGEAL (MCP) JOINT OF LEFT LITTLE FINGER, SUBSEQUENT ENCOUNTER: Primary | ICD-10-CM

## 2019-03-11 PROCEDURE — 97140 MANUAL THERAPY 1/> REGIONS: CPT | Performed by: OCCUPATIONAL THERAPIST

## 2019-03-11 PROCEDURE — 97110 THERAPEUTIC EXERCISES: CPT | Performed by: OCCUPATIONAL THERAPIST

## 2019-03-11 NOTE — PROGRESS NOTES
Daily Note     Today's date: 3/11/2019  Patient name: Maximino Kincaid  : 1975  MRN: [id number]  Referring provider: Mustapha Cruz MD  Dx:   Encounter Diagnosis     ICD-10-CM    1  Sprain of metacarpophalangeal (MCP) joint of left little finger, subsequent encounter S63 657D                   Subjective: I gets stiff      Objective: See treatment diary below      Precautions: universal    Daily Treatment Diary     Manual  2/26 3/7 3/11          Graston hand 3m 3m 3m          MOB MCP  3m 4m 4m          Dorsal Capsular stretches 2m 3m 3m          Intrinsic stretches  2m 2m                           Exercise Diary  2/26 3/7 3/11          AROM digits  10x 10x 10x          PROM SF  10x 10x          powerweb   Red  3x10   Green  3x10          Manipulation  balls  1m 1m                                                                                                                                                                                                              HEP - A/PROM 5x day                Modalities  2/26 3/7 3/11          MH with flex 10m   10m          fluido   10m                                    Assessment: Tolerated treatment well  Patient has capsular tightness that limits MC flexion   Plan: Continue per plan of care

## 2019-03-14 ENCOUNTER — OFFICE VISIT (OUTPATIENT)
Dept: OCCUPATIONAL THERAPY | Facility: CLINIC | Age: 44
End: 2019-03-14
Payer: COMMERCIAL

## 2019-03-14 DIAGNOSIS — S63.657D SPRAIN OF METACARPOPHALANGEAL (MCP) JOINT OF LEFT LITTLE FINGER, SUBSEQUENT ENCOUNTER: Primary | ICD-10-CM

## 2019-03-14 PROCEDURE — 97140 MANUAL THERAPY 1/> REGIONS: CPT | Performed by: OCCUPATIONAL THERAPIST

## 2019-03-14 PROCEDURE — 97110 THERAPEUTIC EXERCISES: CPT | Performed by: OCCUPATIONAL THERAPIST

## 2019-03-14 NOTE — PROGRESS NOTES
Daily Note     Today's date: 3/14/2019  Patient name: Sundar Levine  : 1975  MRN: [id number]  Referring provider: Elaina Cruz MD  Dx:   Encounter Diagnosis     ICD-10-CM    1  Sprain of metacarpophalangeal (MCP) joint of left little finger, subsequent encounter S63 657D                   Subjective: It's moving       Objective: See treatment diary below        Precautions: universal    Daily Treatment Diary     Manual  2/26 3/7 3/11 3/14         Graston hand 3m 3m 3m 3m         MOB MCP  3m 4m 4m 4m         Dorsal Capsular stretches 2m 3m 3m 3m         Intrinsic stretches  2m 2m 2m                          Exercise Diary  2/26 3/7 3/11 3/14         AROM digits  10x 10x 10x 10x         PROM SF  10x 10x 10x         powerweb   Red  3x10   Green  3x10 Blue   3x10         Manipulation  balls  1m 1m                                                                                                                                                                                                              HEP - A/PROM 5x day                Modalities  2/26 3/7 3/11 3/14         MH with flex 10m   10m 10m         fluido   10m                                Assessment: Tolerated treatment well  Patient is making steady gains  L SF MCP flex 82    Plan: Continue per plan of care

## 2019-03-18 ENCOUNTER — OFFICE VISIT (OUTPATIENT)
Dept: OCCUPATIONAL THERAPY | Facility: CLINIC | Age: 44
End: 2019-03-18
Payer: COMMERCIAL

## 2019-03-18 DIAGNOSIS — S63.657D SPRAIN OF METACARPOPHALANGEAL (MCP) JOINT OF LEFT LITTLE FINGER, SUBSEQUENT ENCOUNTER: Primary | ICD-10-CM

## 2019-03-18 PROCEDURE — 97140 MANUAL THERAPY 1/> REGIONS: CPT | Performed by: OCCUPATIONAL THERAPIST

## 2019-03-18 PROCEDURE — 97760 ORTHOTIC MGMT&TRAING 1ST ENC: CPT | Performed by: OCCUPATIONAL THERAPIST

## 2019-03-18 PROCEDURE — 97110 THERAPEUTIC EXERCISES: CPT | Performed by: OCCUPATIONAL THERAPIST

## 2019-03-18 NOTE — PROGRESS NOTES
Daily Note     Today's date: 3/18/2019  Patient name: Ban Henderson  : 1975  MRN: [id number]  Referring provider: Miguel Angel Saeed MD  Dx:   Encounter Diagnosis     ICD-10-CM    1  Sprain of metacarpophalangeal (MCP) joint of left little finger, subsequent encounter S63 657D                   Subjective:  I am stiff      Objective: See treatment diary below        Precautions: universal    Daily Treatment Diary     Manual  2/26 3/7 3/11 3/14 3/18        Graston hand 3m 3m 3m 3m 3m        MOB MCP  3m 4m 4m 4m 4m        Dorsal Capsular stretches 2m 3m 3m 3m 3m        Intrinsic stretches  2m 2m 2m 2m                         Exercise Diary  2/26 3/7 3/11 3/14 3/19        AROM digits  10x 10x 10x 10x 10x        PROM SF  10x 10x 10x 10x        powerweb   Red  3x10   Green  3x10 Blue   3x10 Blue   3x10        Manipulation  balls  1m 1m           Flex  ext      Yellow  3x10        Wrist e/f      3#  3x10                                                                                                                                                                                 HEP - A/PROM 5x day                Modalities  2/26 3/7 3/11 3/14 3/18        MH with flex 10m   10m 10m 10m        fluido   10m                                Assessment: Tolerated treatment well  Patient is making steady gains with MCP flexion  Added dynamic flexion orthosis  at the MCP for night   Plan: Continue per plan of care

## 2019-03-21 ENCOUNTER — OFFICE VISIT (OUTPATIENT)
Dept: OCCUPATIONAL THERAPY | Facility: CLINIC | Age: 44
End: 2019-03-21
Payer: COMMERCIAL

## 2019-03-21 DIAGNOSIS — S63.657D SPRAIN OF METACARPOPHALANGEAL (MCP) JOINT OF LEFT LITTLE FINGER, SUBSEQUENT ENCOUNTER: Primary | ICD-10-CM

## 2019-03-21 PROCEDURE — 97140 MANUAL THERAPY 1/> REGIONS: CPT | Performed by: OCCUPATIONAL THERAPIST

## 2019-03-21 PROCEDURE — 97110 THERAPEUTIC EXERCISES: CPT | Performed by: OCCUPATIONAL THERAPIST

## 2019-03-21 NOTE — PROGRESS NOTES
Daily Note     Today's date: 3/21/2019  Patient name: Maximino Kincaid  : 1975  MRN: [id number]  Referring provider: Mustapha Cruz MD  Dx:   Encounter Diagnosis     ICD-10-CM    1  Sprain of metacarpophalangeal (MCP) joint of left little finger, subsequent encounter S63 657D                   Subjective: It's bending better      Objective: See treatment diary below      Precautions: universal    Daily Treatment Diary     Manual  2/26 3/7 3/11 3/14 3/18 3/21       Graston hand 3m 3m 3m 3m 3m 3m       MOB MCP  3m 4m 4m 4m 4m 4m       Dorsal Capsular stretches 2m 3m 3m 3m 3m 3m       Intrinsic stretches  2m 2m 2m 2m 2m                        Exercise Diary  2/26 3/7 3/11 3/14 3/18 3/21         AROM digits  10x 10x 10x 10x 10x 10x       PROM SF  10x 10x 10x 10x 10x       powerweb   Red  3x10   Green  3x10 Blue   3x10 Blue   3x10 Blue  3x10       Manipulation  balls  1m 1m           Flex  ext      Yellow  3x10 Yellow  3x10       Wrist e/f      3#  3x10 3#  3x10                                                                                                                                                                                HEP - A/PROM 5x day                Modalities  2/26 3/7 3/11 3/14 3/18 3/21       MH with flex 10m   10m 10m 10m 10m       fluido   10m                                    Assessment: Tolerated treatment well  Patient has improved MCP flexion  Plan: Continue per plan of care

## 2019-03-25 ENCOUNTER — OFFICE VISIT (OUTPATIENT)
Dept: OCCUPATIONAL THERAPY | Facility: CLINIC | Age: 44
End: 2019-03-25
Payer: COMMERCIAL

## 2019-03-25 DIAGNOSIS — S63.657D SPRAIN OF METACARPOPHALANGEAL (MCP) JOINT OF LEFT LITTLE FINGER, SUBSEQUENT ENCOUNTER: Primary | ICD-10-CM

## 2019-03-25 PROCEDURE — 97140 MANUAL THERAPY 1/> REGIONS: CPT | Performed by: OCCUPATIONAL THERAPIST

## 2019-03-25 PROCEDURE — 97110 THERAPEUTIC EXERCISES: CPT | Performed by: OCCUPATIONAL THERAPIST

## 2019-03-28 ENCOUNTER — APPOINTMENT (OUTPATIENT)
Dept: OCCUPATIONAL THERAPY | Facility: CLINIC | Age: 44
End: 2019-03-28
Payer: COMMERCIAL

## 2019-04-01 ENCOUNTER — OFFICE VISIT (OUTPATIENT)
Dept: OCCUPATIONAL THERAPY | Facility: CLINIC | Age: 44
End: 2019-04-01
Payer: COMMERCIAL

## 2019-04-01 DIAGNOSIS — S63.657D SPRAIN OF METACARPOPHALANGEAL (MCP) JOINT OF LEFT LITTLE FINGER, SUBSEQUENT ENCOUNTER: Primary | ICD-10-CM

## 2019-04-01 PROCEDURE — 97140 MANUAL THERAPY 1/> REGIONS: CPT | Performed by: OCCUPATIONAL THERAPIST

## 2019-04-01 PROCEDURE — 97110 THERAPEUTIC EXERCISES: CPT | Performed by: OCCUPATIONAL THERAPIST

## 2019-04-04 ENCOUNTER — APPOINTMENT (OUTPATIENT)
Dept: OCCUPATIONAL THERAPY | Facility: CLINIC | Age: 44
End: 2019-04-04
Payer: COMMERCIAL

## 2019-04-08 ENCOUNTER — APPOINTMENT (OUTPATIENT)
Dept: OCCUPATIONAL THERAPY | Facility: CLINIC | Age: 44
End: 2019-04-08
Payer: COMMERCIAL

## 2019-04-10 ENCOUNTER — OFFICE VISIT (OUTPATIENT)
Dept: OBGYN CLINIC | Facility: CLINIC | Age: 44
End: 2019-04-10
Payer: COMMERCIAL

## 2019-04-10 VITALS
WEIGHT: 218 LBS | DIASTOLIC BLOOD PRESSURE: 82 MMHG | BODY MASS INDEX: 35.03 KG/M2 | SYSTOLIC BLOOD PRESSURE: 128 MMHG | HEIGHT: 66 IN | HEART RATE: 80 BPM

## 2019-04-10 DIAGNOSIS — S63.657D SPRAIN OF METACARPOPHALANGEAL (MCP) JOINT OF LEFT LITTLE FINGER, SUBSEQUENT ENCOUNTER: Primary | ICD-10-CM

## 2019-04-10 PROCEDURE — 99213 OFFICE O/P EST LOW 20 MIN: CPT | Performed by: ORTHOPAEDIC SURGERY

## 2019-04-15 ENCOUNTER — APPOINTMENT (OUTPATIENT)
Dept: OCCUPATIONAL THERAPY | Facility: CLINIC | Age: 44
End: 2019-04-15
Payer: COMMERCIAL

## 2019-04-18 ENCOUNTER — OFFICE VISIT (OUTPATIENT)
Dept: OCCUPATIONAL THERAPY | Facility: CLINIC | Age: 44
End: 2019-04-18
Payer: COMMERCIAL

## 2019-04-18 DIAGNOSIS — S63.657D SPRAIN OF METACARPOPHALANGEAL (MCP) JOINT OF LEFT LITTLE FINGER, SUBSEQUENT ENCOUNTER: Primary | ICD-10-CM

## 2019-04-18 PROCEDURE — 97140 MANUAL THERAPY 1/> REGIONS: CPT | Performed by: OCCUPATIONAL THERAPIST

## 2019-04-18 PROCEDURE — 97110 THERAPEUTIC EXERCISES: CPT | Performed by: OCCUPATIONAL THERAPIST

## 2019-04-22 ENCOUNTER — APPOINTMENT (OUTPATIENT)
Dept: OCCUPATIONAL THERAPY | Facility: CLINIC | Age: 44
End: 2019-04-22
Payer: COMMERCIAL

## 2019-08-05 ENCOUNTER — TRANSCRIBE ORDERS (OUTPATIENT)
Dept: SCHEDULING | Age: 44
End: 2019-08-05

## 2019-08-05 DIAGNOSIS — Z12.31 ENCOUNTER FOR SCREENING MAMMOGRAM FOR MALIGNANT NEOPLASM OF BREAST: Primary | ICD-10-CM

## 2019-08-12 ENCOUNTER — HOSPITAL ENCOUNTER (OUTPATIENT)
Dept: RADIOLOGY | Age: 44
Discharge: HOME | End: 2019-08-12
Attending: NURSE PRACTITIONER
Payer: COMMERCIAL

## 2019-08-12 DIAGNOSIS — Z12.31 ENCOUNTER FOR SCREENING MAMMOGRAM FOR MALIGNANT NEOPLASM OF BREAST: ICD-10-CM

## 2019-08-12 PROCEDURE — 77067 SCR MAMMO BI INCL CAD: CPT

## 2019-08-16 ENCOUNTER — OFFICE VISIT (OUTPATIENT)
Dept: URGENT CARE | Facility: CLINIC | Age: 44
End: 2019-08-16
Payer: COMMERCIAL

## 2019-08-16 VITALS
SYSTOLIC BLOOD PRESSURE: 145 MMHG | RESPIRATION RATE: 16 BRPM | WEIGHT: 216 LBS | HEIGHT: 66 IN | HEART RATE: 70 BPM | TEMPERATURE: 99.3 F | OXYGEN SATURATION: 97 % | BODY MASS INDEX: 34.72 KG/M2 | DIASTOLIC BLOOD PRESSURE: 86 MMHG

## 2019-08-16 DIAGNOSIS — L23.7 POISON IVY DERMATITIS: Primary | ICD-10-CM

## 2019-08-16 PROCEDURE — 99213 OFFICE O/P EST LOW 20 MIN: CPT | Performed by: FAMILY MEDICINE

## 2019-08-16 RX ORDER — PREDNISONE 10 MG/1
TABLET ORAL
Qty: 21 TABLET | Refills: 0 | Status: SHIPPED | OUTPATIENT
Start: 2019-08-16

## 2019-08-16 NOTE — PROGRESS NOTES
NAME: Zackery Treviño is a 39 y o  female  : 1975    MRN: [id number]      Assessment and Plan   Poison ivy dermatitis [L23 7]  1  Poison ivy dermatitis  predniSONE 10 mg tablet    Exam findings are consistent with poison ivy  Patient refuse Solu-Medrol injection  At this time will provide patient with a course of prednisone taper  Take medication as noted  Recommend use of OTC calamine lotion, and Benadryl  Discouraged patient from scratching area to prevent spread  Patient understands and agrees with treatment plans  Karen was seen today for rash  Diagnoses and all orders for this visit:    Poison ivy dermatitis  -     predniSONE 10 mg tablet; Take 6 tablets today and decrease by one tablet daily until gone        Patient Instructions   There are no Patient Instructions on file for this visit  Proceed to ER if symptoms worsen  Chief Complaint     Chief Complaint   Patient presents with    Rash     Patient states rash started on face near the right eye,now noticed on the left forehead area and right elbow         History of Present Illness        40 yo pt presents for poison ivy on face x 2  days  Pt reports she was weeding in backyard on Tuesday states rash appeared on face on Thursday  Patient reports she has a rash near right eye, forehead and right forearm  Denies palliative measures  Admits to itching, redness  Denies swelling, open wound, discharge  Review of Systems   Review of Systems   Constitutional: Negative for chills, fatigue and fever  Respiratory: Negative  Cardiovascular: Negative  Musculoskeletal: Negative  Skin: Positive for rash (On face and right forearm)           Current Medications       Current Outpatient Medications:     Cetirizine HCl (ZYRTEC ALLERGY) 10 MG CAPS, Take by mouth, Disp: , Rfl:     JUNEL FE 1/20 1-20 MG-MCG per tablet, Take 1 tablet by mouth daily, Disp: , Rfl: 3    omeprazole (PriLOSEC) 40 MG capsule, Take by mouth, Disp: , Rfl:     predniSONE 10 mg tablet, Take 6 tablets today and decrease by one tablet daily until gone, Disp: 21 tablet, Rfl: 0    Current Allergies     Allergies as of 08/16/2019    (No Known Allergies)              Past Medical History:   Diagnosis Date    Acid reflux        Past Surgical History:   Procedure Laterality Date    AUGMENTATION BREAST Bilateral 2009    lt augmented and rt reduction       Family History   Problem Relation Age of Onset   Areevette Sagastume Breast cancer Mother     Hypertension Father     Breast cancer Maternal Grandmother     Uterine cancer Maternal Grandmother     Breast cancer Paternal Grandmother     Hypertension Paternal Grandfather          Medications have been verified  The following portions of the patient's history were reviewed and updated as appropriate: allergies, current medications, past family history, past medical history, past social history, past surgical history and problem list     Objective   /86   Pulse 70   Temp 99 3 °F (37 4 °C)   Resp 16   Ht 5' 6" (1 676 m)   Wt 98 kg (216 lb)   SpO2 97%   BMI 34 86 kg/m²      Physical Exam     Physical Exam   Constitutional: She is oriented to person, place, and time  She appears well-developed and well-nourished  No distress  Cardiovascular: Normal rate, regular rhythm, normal heart sounds and intact distal pulses  Exam reveals no gallop and no friction rub  No murmur heard  Pulmonary/Chest: Effort normal and breath sounds normal  No stridor  No respiratory distress  She has no wheezes  She has no rales  She exhibits no tenderness  Neurological: She is alert and oriented to person, place, and time  Skin: Skin is warm  Rash noted  Rash is vesicular (Erythematous vesicular rash medial aspect of right eye proximal to nose bridge, left side forehead and right volar aspect of forearm)  She is not diaphoretic  Nursing note and vitals reviewed        Eufemia Hagen PA-C

## 2020-01-20 ENCOUNTER — TRANSCRIBE ORDERS (OUTPATIENT)
Dept: ADMINISTRATIVE | Facility: HOSPITAL | Age: 45
End: 2020-01-20

## 2020-01-20 DIAGNOSIS — R10.2 PELVIC PAIN IN FEMALE: ICD-10-CM

## 2020-01-20 DIAGNOSIS — R10.9 ABDOMINAL PAIN, UNSPECIFIED ABDOMINAL LOCATION: Primary | ICD-10-CM

## 2020-01-31 ENCOUNTER — HOSPITAL ENCOUNTER (OUTPATIENT)
Dept: ULTRASOUND IMAGING | Facility: CLINIC | Age: 45
Discharge: HOME | End: 2020-01-31
Payer: COMMERCIAL

## 2020-01-31 ENCOUNTER — APPOINTMENT (OUTPATIENT)
Dept: RADIOLOGY | Facility: CLINIC | Age: 45
End: 2020-01-31
Payer: COMMERCIAL

## 2020-01-31 ENCOUNTER — TRANSCRIBE ORDERS (OUTPATIENT)
Dept: ADMINISTRATIVE | Facility: HOSPITAL | Age: 45
End: 2020-01-31

## 2020-01-31 DIAGNOSIS — R10.2 PELVIC PAIN IN FEMALE: ICD-10-CM

## 2020-01-31 DIAGNOSIS — R10.2 PERINEAL NEURALGIA, UNSPECIFIED LATERALITY: ICD-10-CM

## 2020-01-31 DIAGNOSIS — M79.672 LEFT FOOT PAIN: Primary | ICD-10-CM

## 2020-01-31 DIAGNOSIS — R10.9 ABDOMINAL PAIN, UNSPECIFIED ABDOMINAL LOCATION: ICD-10-CM

## 2020-01-31 DIAGNOSIS — M79.672 LEFT FOOT PAIN: ICD-10-CM

## 2020-01-31 DIAGNOSIS — M25.551 RIGHT HIP PAIN: ICD-10-CM

## 2020-01-31 DIAGNOSIS — R10.9 STOMACH ACHE: ICD-10-CM

## 2020-01-31 PROCEDURE — 73650 X-RAY EXAM OF HEEL: CPT

## 2020-01-31 PROCEDURE — 76830 TRANSVAGINAL US NON-OB: CPT

## 2020-01-31 PROCEDURE — 74018 RADEX ABDOMEN 1 VIEW: CPT

## 2020-01-31 PROCEDURE — 76700 US EXAM ABDOM COMPLETE: CPT

## 2020-01-31 PROCEDURE — 73502 X-RAY EXAM HIP UNI 2-3 VIEWS: CPT

## 2020-01-31 PROCEDURE — 76856 US EXAM PELVIC COMPLETE: CPT

## 2020-02-14 ENCOUNTER — HOSPITAL ENCOUNTER (OUTPATIENT)
Dept: CT IMAGING | Facility: HOSPITAL | Age: 45
Discharge: HOME | End: 2020-02-14
Payer: COMMERCIAL

## 2020-02-14 DIAGNOSIS — R10.9 STOMACH ACHE: ICD-10-CM

## 2020-02-14 PROCEDURE — 74177 CT ABD & PELVIS W/CONTRAST: CPT

## 2020-02-14 RX ADMIN — IOHEXOL 100 ML: 350 INJECTION, SOLUTION INTRAVENOUS at 11:19

## 2020-02-22 ENCOUNTER — APPOINTMENT (OUTPATIENT)
Dept: RADIOLOGY | Facility: CLINIC | Age: 45
End: 2020-02-22
Payer: COMMERCIAL

## 2020-02-22 ENCOUNTER — OFFICE VISIT (OUTPATIENT)
Dept: URGENT CARE | Facility: CLINIC | Age: 45
End: 2020-02-22
Payer: COMMERCIAL

## 2020-02-22 VITALS
OXYGEN SATURATION: 98 % | RESPIRATION RATE: 16 BRPM | HEART RATE: 88 BPM | HEIGHT: 66 IN | BODY MASS INDEX: 34.72 KG/M2 | TEMPERATURE: 99.3 F | DIASTOLIC BLOOD PRESSURE: 78 MMHG | SYSTOLIC BLOOD PRESSURE: 132 MMHG | WEIGHT: 216 LBS

## 2020-02-22 DIAGNOSIS — S69.91XA HAND INJURY, RIGHT, INITIAL ENCOUNTER: ICD-10-CM

## 2020-02-22 DIAGNOSIS — S63.259A DISLOCATION OF FINGER, INITIAL ENCOUNTER: Primary | ICD-10-CM

## 2020-02-22 PROCEDURE — 73130 X-RAY EXAM OF HAND: CPT

## 2020-02-22 PROCEDURE — 26770 TREAT FINGER DISLOCATION: CPT | Performed by: PREVENTIVE MEDICINE

## 2020-02-22 PROCEDURE — 99213 OFFICE O/P EST LOW 20 MIN: CPT | Performed by: NURSE PRACTITIONER

## 2020-02-22 NOTE — PATIENT INSTRUCTIONS
We saw you today for a finger dislocation  We were able to fix the dislocation  Wear the splint for the next week  Rest   You may apply ice to it  You may elevated  You may take Motrin for discomfort  Follow-up with your family doctor for any concerns

## 2020-02-22 NOTE — PROGRESS NOTES
Assessment/Plan    Dislocation of finger, initial encounter [H74 259A]  1  Dislocation of finger, initial encounter  Orthopedic injury treatment    Splint application   2  Hand injury, right, initial encounter  XR hand 3+ vw right    XR hand 3+ vw right         Subjective:     Patient ID: Setphen Reese is a 38 y o  female  Reason For Visit / Chief Complaint  Chief Complaint   Patient presents with    Finger Injury     today the pt fell on the sidewalk  she has pain in her right 5th finger  This is a 43-year-old female patient who presents to the urgent care today  She is accompanied by her   Patient fell on the sidewalk today and fell onto her right 5th finger  She is here because she is unable to move her finger and it is painful and swollen  Patient has states that she does not know when her last tetanus shot was  Patient denies fever or chills  Patient denies chest pain or shortness of breath  Patient is complaining of small abrasion to her finger  In addition patient was scratched by a dog earlier this week and she has 2 wounds from that  Past Medical History:   Diagnosis Date    Acid reflux        Past Surgical History:   Procedure Laterality Date    AUGMENTATION BREAST Bilateral 2009    lt augmented and rt reduction       Family History   Problem Relation Age of Onset   Juvenal Rose Breast cancer Mother     Hypertension Father     Breast cancer Maternal Grandmother     Uterine cancer Maternal Grandmother     Breast cancer Paternal Grandmother     Hypertension Paternal Grandfather        Review of Systems   Constitutional: Negative for chills and fever  Respiratory: Negative for shortness of breath  Cardiovascular: Negative for chest pain  Musculoskeletal: Positive for joint swelling  Skin: Positive for wound  Negative for color change  Neurological: Negative for headaches         Objective:    /78   Pulse 88   Temp 99 3 °F (37 4 °C)   Resp 16   Ht 5' 6" (1 676 m)   Wt 98 kg (216 lb)   SpO2 98%   BMI 34 86 kg/m²       Physical Exam   Constitutional: She is oriented to person, place, and time  Vital signs are normal  She appears well-developed and well-nourished  She does not appear ill  HENT:   Head: Normocephalic and atraumatic  Neck: Normal range of motion  Cardiovascular: Normal rate, regular rhythm and normal heart sounds  Exam reveals no gallop and no friction rub  No murmur heard  Pulmonary/Chest: Effort normal and breath sounds normal  No stridor  No respiratory distress  She has no wheezes  She has no rales  She exhibits no tenderness  Musculoskeletal: She exhibits tenderness and deformity  Right hand: She exhibits decreased range of motion, tenderness, deformity and swelling  I manually reduce the dislocation  Post manual reduction, patient has full range of motion and has relief of pain  Neurological: She is alert and oriented to person, place, and time  Skin: Skin is warm and dry  Capillary refill takes less than 2 seconds  Abrasion noted  Psychiatric: She has a normal mood and affect  Nursing note and vitals reviewed  Orthopedic injury treatment  Date/Time: 2/22/2020 1:56 PM  Performed by: ALFONSO Jeffries  Authorized by:  ALFONSO Jeffries     Injury location:  Finger  Location details:  Right little finger  Injury type:  Dislocation  Dislocation type: DIP    Neurovascular status: Neurovascularly intact    Distal perfusion: normal    Neurological function: normal    Range of motion: reduced    Local anesthesia used?: No    General anesthesia used?: No    Manipulation performed?: Yes    Reduction successful?: Yes    Confirmation: Reduction confirmed by x-ray    Immobilization:  Splint  Splint type:  Finger splint, static  Supplies used:  Aluminum splint  Neurovascular status: Neurovascularly intact    Distal perfusion: normal    Neurological function: normal    Range of motion: normal    Patient tolerance: Patient tolerated the procedure well with no immediate complications        1st xray: distal phalange dislocated  Dislocation reduced  2nd xray: normal, finger static splint applied  TDAP refused

## 2020-03-04 ENCOUNTER — OFFICE VISIT (OUTPATIENT)
Dept: OBGYN CLINIC | Facility: CLINIC | Age: 45
End: 2020-03-04
Payer: COMMERCIAL

## 2020-03-04 VITALS
HEIGHT: 66 IN | SYSTOLIC BLOOD PRESSURE: 128 MMHG | BODY MASS INDEX: 34.07 KG/M2 | DIASTOLIC BLOOD PRESSURE: 80 MMHG | HEART RATE: 82 BPM | WEIGHT: 212 LBS

## 2020-03-04 DIAGNOSIS — S63.296A DISLOCATION OF DISTAL INTERPHALANGEAL (DIP) JOINT OF RIGHT LITTLE FINGER, INITIAL ENCOUNTER: Primary | ICD-10-CM

## 2020-03-04 PROBLEM — S63.657A: Status: RESOLVED | Noted: 2019-01-10 | Resolved: 2020-03-04

## 2020-03-04 PROCEDURE — 99213 OFFICE O/P EST LOW 20 MIN: CPT | Performed by: ORTHOPAEDIC SURGERY

## 2020-03-04 NOTE — PROGRESS NOTES
Assessment:     1  Dislocation of distal interphalangeal (DIP) joint of right little finger, initial encounter        Plan:     Problem List Items Addressed This Visit        Musculoskeletal and Integument    Dislocation of distal interphalangeal (DIP) joint of right little finger - Primary     Findings consistent with right little finger DIP dorsal dislocation with successful reduction  Discussed findings and treatment options with the patient  I have reviewed patient's right little finger x-ray  I discussed prognosis of her injury  I recommended patient to start doing active range of motion with the right little finger  I advised patient to avoid hyper-extension of the right little finger DIP joint  I provided patient a stack splint for protection  I will see patient back in 4-6 weeks or as needed  All patient's questions were answered to her satisfaction  This note is created using dictation transcription  It may contain typographical errors, grammatical errors, improperly dictated words, background noise and other errors  Subjective:     Patient ID: Karma Benedict is a 38 y o  female  Chief Complaint:  43-year-old female injured her right little finger on 2/22/2020  She fell on the sidewalk and jammed her right little finger  She noticed the deformity and pain  She was seen in urgent care and reduction was done  She has been wearing a foam splint  She is complaining of stiffness in her right little finger  Information on patient's intake form was reviewed      Allergy:  No Known Allergies  Medications:  all current active meds have been reviewed  Past Medical History:  Past Medical History:   Diagnosis Date    Acid reflux      Past Surgical History:  Past Surgical History:   Procedure Laterality Date    AUGMENTATION BREAST Bilateral 2009    lt augmented and rt reduction     Family History:  Family History   Problem Relation Age of Onset    Breast cancer Mother     Hypertension Father     Breast cancer Maternal Grandmother     Uterine cancer Maternal Grandmother     Breast cancer Paternal Grandmother     Hypertension Paternal Grandfather      Social History:  Social History     Substance and Sexual Activity   Alcohol Use Yes    Comment: social     Social History     Substance and Sexual Activity   Drug Use No     Social History     Tobacco Use   Smoking Status Never Smoker   Smokeless Tobacco Never Used     Review of Systems   Constitutional: Negative  HENT: Negative  Eyes: Negative  Respiratory: Negative  Cardiovascular: Negative  Gastrointestinal: Negative  Endocrine: Negative  Genitourinary: Negative  Musculoskeletal: Positive for arthralgias (Right little finger) and joint swelling (Right little finger)  Skin: Negative  Allergic/Immunologic: Negative  Neurological: Negative  Hematological: Negative  Psychiatric/Behavioral: Negative  Objective:  BP Readings from Last 1 Encounters:   03/04/20 128/80      Wt Readings from Last 1 Encounters:   03/04/20 96 2 kg (212 lb)      BMI:   Estimated body mass index is 34 22 kg/m² as calculated from the following:    Height as of this encounter: 5' 6" (1 676 m)  Weight as of this encounter: 96 2 kg (212 lb)  BSA:   Estimated body surface area is 2 05 meters squared as calculated from the following:    Height as of this encounter: 5' 6" (1 676 m)  Weight as of this encounter: 96 2 kg (212 lb)  Physical Exam   Constitutional: She is oriented to person, place, and time  She appears well-developed  HENT:   Head: Normocephalic and atraumatic  Eyes: Conjunctivae and EOM are normal    Neck: Neck supple  Pulmonary/Chest: Effort normal    Neurological: She is alert and oriented to person, place, and time  Skin: Skin is warm  Psychiatric: She has a normal mood and affect  Nursing note and vitals reviewed      Right Hand Exam     Tenderness   Right hand tenderness location: Right little finger DIP  Range of Motion   Hand   DIP Little: abnormal     Other   Erythema: absent  Sensation: normal  Pulse: present    Comments:  Right little finger DIP joint stable with radial and ulna stressed            I have personally reviewed pertinent films in PACS and my interpretation is Right little finger dorsal dislocation with successful reduction  No fracture

## 2020-03-04 NOTE — ASSESSMENT & PLAN NOTE
Findings consistent with right little finger DIP dorsal dislocation with successful reduction  Discussed findings and treatment options with the patient  I have reviewed patient's right little finger x-ray  I discussed prognosis of her injury  I recommended patient to start doing active range of motion with the right little finger  I advised patient to avoid hyper-extension of the right little finger DIP joint  I provided patient a stack splint for protection  I will see patient back in 4-6 weeks or as needed  All patient's questions were answered to her satisfaction  This note is created using dictation transcription  It may contain typographical errors, grammatical errors, improperly dictated words, background noise and other errors

## 2020-08-21 ENCOUNTER — TRANSCRIBE ORDERS (OUTPATIENT)
Dept: SCHEDULING | Age: 45
End: 2020-08-21

## 2020-08-21 DIAGNOSIS — Z12.31 ENCOUNTER FOR SCREENING MAMMOGRAM FOR MALIGNANT NEOPLASM OF BREAST: Primary | ICD-10-CM

## 2020-08-25 ENCOUNTER — HOSPITAL ENCOUNTER (OUTPATIENT)
Dept: RADIOLOGY | Age: 45
Discharge: HOME | End: 2020-08-25
Attending: NURSE PRACTITIONER
Payer: COMMERCIAL

## 2020-08-25 DIAGNOSIS — Z12.31 ENCOUNTER FOR SCREENING MAMMOGRAM FOR MALIGNANT NEOPLASM OF BREAST: ICD-10-CM

## 2020-08-25 PROCEDURE — 77063 BREAST TOMOSYNTHESIS BI: CPT

## 2021-01-03 NOTE — PROGRESS NOTES
Daily Note     Today's date: 3/25/2019  Patient name: Master Solomon  : 1975  MRN: [id number]  Referring provider: Te Thomason MD  Dx:   Encounter Diagnosis     ICD-10-CM    1  Sprain of metacarpophalangeal (MCP) joint of left little finger, subsequent encounter [id number]                   Subjective: I am sore but moving better      Objective: See treatment diary below        Precautions: universal    Daily Treatment Diary     Manual  2/26 3/7 3/11 3/14 3/18 3/21 3/25      Graston hand 3m 3m 3m 3m 3m 3m 3m      MOB MCP  3m 4m 4m 4m 4m 4m 4m      Dorsal Capsular stretches 2m 3m 3m 3m 3m 3m 3m      Intrinsic stretches  2m 2m 2m 2m 2m 2m                       Exercise Diary  2/26 3/7 3/11 3/14 3/18 3/21   3/25      AROM digits  10x 10x 10x 10x 10x 10x 10x      PROM SF  10x 10x 10x 10x 10x 10x      powerweb   Red  3x10   Green  3x10 Blue   3x10 Blue   3x10 Blue  3x10 Blue  3x10      Manipulation  balls  1m 1m           Flex  ext      Yellow  3x10 Yellow  3x10 Yellow  3x10      Wrist e/f      3#  3x10 3#  3x10 3#  3x10                                                                                                                                                                               HEP - A/PROM 5x day                Modalities  2/26 3/7 3/11 3/14 3/18 3/21 3/25      MH with flex 10m   10m 10m 10m 10m 10m      fluido   10m                                    Assessment: Tolerated treatment well  Patient has mild discomfort in the 5ht MCP   She has full ROm post tx   Plan: Continue per plan of care 
- - -

## 2021-08-23 ENCOUNTER — TRANSCRIBE ORDERS (OUTPATIENT)
Dept: SCHEDULING | Age: 46
End: 2021-08-23

## 2021-08-23 DIAGNOSIS — Z12.31 ENCOUNTER FOR SCREENING MAMMOGRAM FOR MALIGNANT NEOPLASM OF BREAST: Primary | ICD-10-CM

## 2021-09-16 ENCOUNTER — HOSPITAL ENCOUNTER (OUTPATIENT)
Dept: RADIOLOGY | Age: 46
Discharge: HOME | End: 2021-09-16
Attending: NURSE PRACTITIONER
Payer: COMMERCIAL

## 2021-09-16 DIAGNOSIS — Z12.31 ENCOUNTER FOR SCREENING MAMMOGRAM FOR MALIGNANT NEOPLASM OF BREAST: ICD-10-CM

## 2021-09-16 PROCEDURE — 77063 BREAST TOMOSYNTHESIS BI: CPT

## 2021-09-17 ENCOUNTER — TRANSCRIBE ORDERS (OUTPATIENT)
Dept: REGISTRATION | Age: 46
End: 2021-09-17
Payer: COMMERCIAL

## 2021-09-17 DIAGNOSIS — R92.8 OTHER ABNORMAL AND INCONCLUSIVE FINDINGS ON DIAGNOSTIC IMAGING OF BREAST: Primary | ICD-10-CM

## 2021-09-24 ENCOUNTER — HOSPITAL ENCOUNTER (OUTPATIENT)
Dept: RADIOLOGY | Age: 46
Discharge: HOME | End: 2021-09-24
Attending: STUDENT IN AN ORGANIZED HEALTH CARE EDUCATION/TRAINING PROGRAM
Payer: COMMERCIAL

## 2021-09-24 DIAGNOSIS — R92.8 OTHER ABNORMAL AND INCONCLUSIVE FINDINGS ON DIAGNOSTIC IMAGING OF BREAST: ICD-10-CM

## 2021-09-24 PROCEDURE — 77065 DX MAMMO INCL CAD UNI: CPT | Mod: RT

## 2021-09-24 PROCEDURE — 76642 ULTRASOUND BREAST LIMITED: CPT | Mod: RT

## 2021-12-01 ENCOUNTER — OFFICE VISIT (OUTPATIENT)
Dept: URGENT CARE | Facility: CLINIC | Age: 46
End: 2021-12-01
Payer: COMMERCIAL

## 2021-12-01 VITALS
OXYGEN SATURATION: 98 % | TEMPERATURE: 98.1 F | RESPIRATION RATE: 16 BRPM | BODY MASS INDEX: 34.53 KG/M2 | HEIGHT: 67 IN | HEART RATE: 80 BPM | WEIGHT: 220 LBS

## 2021-12-01 DIAGNOSIS — J02.9 SORE THROAT: Primary | ICD-10-CM

## 2021-12-01 LAB — S PYO AG THROAT QL: NEGATIVE

## 2021-12-01 PROCEDURE — 87147 CULTURE TYPE IMMUNOLOGIC: CPT | Performed by: PHYSICIAN ASSISTANT

## 2021-12-01 PROCEDURE — 87070 CULTURE OTHR SPECIMN AEROBIC: CPT | Performed by: PHYSICIAN ASSISTANT

## 2021-12-01 PROCEDURE — 99213 OFFICE O/P EST LOW 20 MIN: CPT | Performed by: PHYSICIAN ASSISTANT

## 2021-12-03 LAB — BACTERIA THROAT CULT: ABNORMAL

## 2021-12-03 RX ORDER — AMOXICILLIN 875 MG/1
875 TABLET, COATED ORAL 2 TIMES DAILY
Qty: 14 TABLET | Refills: 0 | Status: SHIPPED | OUTPATIENT
Start: 2021-12-03 | End: 2021-12-10

## 2022-03-01 ENCOUNTER — TRANSCRIBE ORDERS (OUTPATIENT)
Dept: SCHEDULING | Age: 47
End: 2022-03-01

## 2022-03-01 DIAGNOSIS — R92.8 OTHER ABNORMAL AND INCONCLUSIVE FINDINGS ON DIAGNOSTIC IMAGING OF BREAST: Primary | ICD-10-CM

## 2022-03-29 ENCOUNTER — HOSPITAL ENCOUNTER (OUTPATIENT)
Dept: RADIOLOGY | Age: 47
Discharge: HOME | End: 2022-03-29
Attending: NURSE PRACTITIONER
Payer: COMMERCIAL

## 2022-03-29 DIAGNOSIS — R92.8 OTHER ABNORMAL AND INCONCLUSIVE FINDINGS ON DIAGNOSTIC IMAGING OF BREAST: ICD-10-CM

## 2022-03-29 PROCEDURE — 77065 DX MAMMO INCL CAD UNI: CPT | Mod: RT

## 2022-03-29 PROCEDURE — G0279 TOMOSYNTHESIS, MAMMO: HCPCS | Mod: RT

## 2022-09-22 ENCOUNTER — TRANSCRIBE ORDERS (OUTPATIENT)
Dept: SCHEDULING | Age: 47
End: 2022-09-22

## 2022-09-22 DIAGNOSIS — Z12.31 ENCOUNTER FOR SCREENING MAMMOGRAM FOR MALIGNANT NEOPLASM OF BREAST: Primary | ICD-10-CM

## 2022-09-30 ENCOUNTER — HOSPITAL ENCOUNTER (OUTPATIENT)
Dept: RADIOLOGY | Age: 47
Discharge: HOME | End: 2022-09-30
Attending: NURSE PRACTITIONER
Payer: COMMERCIAL

## 2022-09-30 DIAGNOSIS — Z12.31 ENCOUNTER FOR SCREENING MAMMOGRAM FOR MALIGNANT NEOPLASM OF BREAST: ICD-10-CM

## 2022-09-30 PROCEDURE — 77063 BREAST TOMOSYNTHESIS BI: CPT

## 2023-10-05 ENCOUNTER — TRANSCRIBE ORDERS (OUTPATIENT)
Dept: SCHEDULING | Age: 48
End: 2023-10-05

## 2023-10-05 DIAGNOSIS — Z12.31 ENCOUNTER FOR SCREENING MAMMOGRAM FOR MALIGNANT NEOPLASM OF BREAST: Primary | ICD-10-CM

## 2023-10-23 ENCOUNTER — HOSPITAL ENCOUNTER (OUTPATIENT)
Dept: RADIOLOGY | Age: 48
Discharge: HOME | End: 2023-10-23
Attending: NURSE PRACTITIONER
Payer: COMMERCIAL

## 2023-10-23 DIAGNOSIS — Z12.31 ENCOUNTER FOR SCREENING MAMMOGRAM FOR MALIGNANT NEOPLASM OF BREAST: ICD-10-CM

## 2023-10-23 PROCEDURE — 77067 SCR MAMMO BI INCL CAD: CPT

## 2023-11-07 ENCOUNTER — HOSPITAL ENCOUNTER (OUTPATIENT)
Dept: RADIOLOGY | Age: 48
Discharge: HOME | End: 2023-11-07
Attending: RADIOLOGY
Payer: COMMERCIAL

## 2023-11-07 DIAGNOSIS — R92.8 ABNORMAL MAMMOGRAM: ICD-10-CM

## 2023-11-07 PROCEDURE — 77065 DX MAMMO INCL CAD UNI: CPT | Mod: LT

## 2023-11-07 PROCEDURE — 76642 ULTRASOUND BREAST LIMITED: CPT | Mod: LT

## 2024-02-28 ENCOUNTER — OFFICE VISIT (OUTPATIENT)
Dept: PODIATRY | Facility: CLINIC | Age: 49
End: 2024-02-28
Payer: COMMERCIAL

## 2024-02-28 VITALS
HEIGHT: 67 IN | BODY MASS INDEX: 37.04 KG/M2 | WEIGHT: 236 LBS | SYSTOLIC BLOOD PRESSURE: 152 MMHG | DIASTOLIC BLOOD PRESSURE: 89 MMHG

## 2024-02-28 DIAGNOSIS — M24.875 OTHER SPECIFIC JOINT DERANGEMENTS LEFT FOOT, NOT ELSEWHERE CLASSIFIED: Primary | ICD-10-CM

## 2024-02-28 DIAGNOSIS — M21.42 PES PLANUS OF BOTH FEET: ICD-10-CM

## 2024-02-28 DIAGNOSIS — M25.572 SINUS TARSI SYNDROME, LEFT: ICD-10-CM

## 2024-02-28 DIAGNOSIS — M21.41 PES PLANUS OF BOTH FEET: ICD-10-CM

## 2024-02-28 PROCEDURE — 99203 OFFICE O/P NEW LOW 30 MIN: CPT | Performed by: PODIATRIST

## 2024-02-28 NOTE — PROGRESS NOTES
PATIENT:  Karen Ballard  1975       ASSESSMENT:     1. Other specific joint derangements left foot, not elsewhere classified  Ambulatory referral to Physical Therapy      2. Sinus tarsi syndrome, left  Ambulatory referral to Physical Therapy      3. Pes planus of both feet  Ambulatory referral to Physical Therapy                PLAN:  1. Reviewed medical records.  Patient was counseled and educated on the condition and the diagnosis.    2. The exam and symptoms are consistent with left sinus tarsi syndrome.  It is due to hyperpronation of her foot associated with flexible pes planus.  The diagnosis, treatment options and prognosis were discussed with the patient.    3.  Pt not amenable for any injection today.  Referred her to PT.  Sent her for OTC orthotics for better support and limit pronation of her foot.    4.  Instructed supportive care, home exercise, icing, and proper footwear/ arch support.   Possible injection depending on the progress.    5. Patient will return in 7 weeks for re-evaluation.       Imaging: I have personally reviewed pertinent films in PACS  Labs, pathology, and Other Studies: I have personally reviewed pertinent reports.        Subjective:       HPI  The patient presents with chief complaint of left ankle pain for about 3 months.  Pain presents around left ankle with throbbing and aches.  Pain increases after being on her feet.   The patient does not recall any injury, but reports some overuse.  The patient tried OTC meds, and different shoes without relieving the pain.  Her ankle might swell up on some days.  No associated numbness or paresthesia.  No significant weakness or dysfunction.         The following portions of the patient's history were reviewed and updated as appropriate: allergies, current medications, past family history, past medical history, past social history, past surgical history and problem list.  All pertinent labs and images were reviewed.       Past Medical History  Past Medical History:   Diagnosis Date    Acid reflux        Past Surgical History  Past Surgical History:   Procedure Laterality Date    AUGMENTATION BREAST Bilateral 2009    lt augmented and rt reduction        Allergies:  Patient has no known allergies.    Medications:  Current Outpatient Medications   Medication Sig Dispense Refill    Cetirizine HCl (ZYRTEC ALLERGY) 10 MG CAPS Take by mouth      JUNEL FE 1/20 1-20 MG-MCG per tablet Take 1 tablet by mouth daily  3    omeprazole (PriLOSEC) 40 MG capsule Take by mouth      predniSONE 10 mg tablet Take 6 tablets today and decrease by one tablet daily until gone (Patient not taking: Reported on 3/4/2020) 21 tablet 0     No current facility-administered medications for this visit.       Social History:  Social History     Socioeconomic History    Marital status: /Civil Union     Spouse name: None    Number of children: None    Years of education: None    Highest education level: None   Occupational History    None   Tobacco Use    Smoking status: Never    Smokeless tobacco: Never   Substance and Sexual Activity    Alcohol use: Yes     Comment: social    Drug use: No    Sexual activity: None   Other Topics Concern    None   Social History Narrative    None     Social Determinants of Health     Financial Resource Strain: Not on file   Food Insecurity: Not on file   Transportation Needs: Not on file   Physical Activity: Not on file   Stress: Not on file   Social Connections: Not on file   Intimate Partner Violence: Not on file   Housing Stability: Not on file          Review of Systems   Constitutional:  Negative for chills and fever.   Respiratory:  Negative for cough and shortness of breath.    Cardiovascular:  Negative for chest pain.   Gastrointestinal:  Negative for nausea and vomiting.   Musculoskeletal:  Negative for gait problem.   Skin:  Negative for wound.   Allergic/Immunologic: Negative for immunocompromised state.  "  Neurological:  Negative for weakness and numbness.   Hematological: Negative.    Psychiatric/Behavioral:  Negative for behavioral problems and confusion.          Objective:      /89   Ht 5' 7\" (1.702 m)   Wt 107 kg (236 lb)   BMI 36.96 kg/m²          Physical Exam  Vitals reviewed.   Constitutional:       General: She is not in acute distress.     Appearance: She is not toxic-appearing or diaphoretic.   HENT:      Head: Normocephalic and atraumatic.   Eyes:      Extraocular Movements: Extraocular movements intact.   Cardiovascular:      Rate and Rhythm: Normal rate and regular rhythm.      Pulses: Normal pulses.           Dorsalis pedis pulses are 2+ on the right side and 2+ on the left side.        Posterior tibial pulses are 2+ on the right side and 2+ on the left side.   Pulmonary:      Effort: Pulmonary effort is normal. No respiratory distress.   Musculoskeletal:         General: Tenderness and deformity present. No signs of injury.      Cervical back: Normal range of motion and neck supple.      Right lower leg: No edema.      Left lower leg: No edema.      Right foot: No foot drop.      Left foot: No foot drop.      Comments: Pain presents around left sinus tarsi.  Flexible pes planus with collapsing arch upon stance.     Skin:     General: Skin is warm.      Capillary Refill: Capillary refill takes less than 2 seconds.      Coloration: Skin is not cyanotic or mottled.      Findings: No abscess.      Nails: There is no clubbing.   Neurological:      General: No focal deficit present.      Mental Status: She is alert and oriented to person, place, and time.      Cranial Nerves: No cranial nerve deficit.      Sensory: No sensory deficit.      Motor: No weakness.      Coordination: Coordination normal.   Psychiatric:         Mood and Affect: Mood normal.         Behavior: Behavior normal.         Thought Content: Thought content normal.         Judgment: Judgment normal.         "

## 2024-04-09 DIAGNOSIS — Z00.6 ENCOUNTER FOR EXAMINATION FOR NORMAL COMPARISON OR CONTROL IN CLINICAL RESEARCH PROGRAM: ICD-10-CM

## 2024-04-10 ENCOUNTER — APPOINTMENT (OUTPATIENT)
Dept: LAB | Facility: CLINIC | Age: 49
End: 2024-04-10

## 2024-04-10 DIAGNOSIS — Z00.6 ENCOUNTER FOR EXAMINATION FOR NORMAL COMPARISON OR CONTROL IN CLINICAL RESEARCH PROGRAM: ICD-10-CM

## 2024-04-10 PROCEDURE — 36415 COLL VENOUS BLD VENIPUNCTURE: CPT

## 2024-04-28 LAB
APOB+LDLR+PCSK9 GENE MUT ANL BLD/T: NOT DETECTED
BRCA1+BRCA2 DEL+DUP + FULL MUT ANL BLD/T: NOT DETECTED
MLH1+MSH2+MSH6+PMS2 GN DEL+DUP+FUL M: NOT DETECTED

## 2024-05-22 ENCOUNTER — TRANSCRIBE ORDERS (OUTPATIENT)
Dept: SCHEDULING | Age: 49
End: 2024-05-22

## 2024-05-22 DIAGNOSIS — R92.8 OTHER ABNORMAL AND INCONCLUSIVE FINDINGS ON DIAGNOSTIC IMAGING OF BREAST: Primary | ICD-10-CM

## 2024-06-11 ENCOUNTER — HOSPITAL ENCOUNTER (OUTPATIENT)
Dept: RADIOLOGY | Age: 49
Discharge: HOME | End: 2024-06-11
Attending: NURSE PRACTITIONER
Payer: COMMERCIAL

## 2024-06-11 DIAGNOSIS — R92.8 OTHER ABNORMAL AND INCONCLUSIVE FINDINGS ON DIAGNOSTIC IMAGING OF BREAST: ICD-10-CM

## 2024-06-11 PROCEDURE — 77065 DX MAMMO INCL CAD UNI: CPT | Mod: LT

## 2024-06-11 PROCEDURE — 76642 ULTRASOUND BREAST LIMITED: CPT | Mod: LT

## 2024-12-20 ENCOUNTER — TRANSCRIBE ORDERS (OUTPATIENT)
Dept: SCHEDULING | Age: 49
End: 2024-12-20

## 2024-12-20 DIAGNOSIS — Z12.31 ENCOUNTER FOR SCREENING MAMMOGRAM FOR MALIGNANT NEOPLASM OF BREAST: Primary | ICD-10-CM

## 2025-01-17 ENCOUNTER — HOSPITAL ENCOUNTER (OUTPATIENT)
Dept: RADIOLOGY | Age: 50
Discharge: HOME | End: 2025-01-17
Attending: NURSE PRACTITIONER
Payer: COMMERCIAL

## 2025-01-17 DIAGNOSIS — Z12.31 ENCOUNTER FOR SCREENING MAMMOGRAM FOR MALIGNANT NEOPLASM OF BREAST: ICD-10-CM

## 2025-01-17 PROCEDURE — 77067 SCR MAMMO BI INCL CAD: CPT

## 2025-01-17 PROCEDURE — 77063 BREAST TOMOSYNTHESIS BI: CPT
